# Patient Record
Sex: FEMALE | Race: WHITE | NOT HISPANIC OR LATINO | Employment: FULL TIME | URBAN - METROPOLITAN AREA
[De-identification: names, ages, dates, MRNs, and addresses within clinical notes are randomized per-mention and may not be internally consistent; named-entity substitution may affect disease eponyms.]

---

## 2020-11-20 ENCOUNTER — APPOINTMENT (RX ONLY)
Dept: URBAN - METROPOLITAN AREA CLINIC 32 | Facility: CLINIC | Age: 55
Setting detail: DERMATOLOGY
End: 2020-11-20

## 2020-11-20 DIAGNOSIS — Z41.9 ENCOUNTER FOR PROCEDURE FOR PURPOSES OTHER THAN REMEDYING HEALTH STATE, UNSPECIFIED: ICD-10-CM

## 2020-11-20 PROCEDURE — ? BOTOX

## 2020-11-20 NOTE — PROCEDURE: BOTOX
Show Lcl Units: No
Masseter Units: 0
Show Depressor Anguli Units: Yes
Detail Level: Detailed
Forehead Units: 40
Dilution (U/0.1 Cc): 4
Consent: Written consent obtained. Risks include but not limited to lid/brow ptosis, bruising, swelling, diplopia, temporary effect, incomplete chemical denervation.
Post-Care Instructions: Patient instructed to not lie down for 4 hours and limit physical activity for 24 hours.

## 2021-03-22 ENCOUNTER — APPOINTMENT (RX ONLY)
Dept: URBAN - METROPOLITAN AREA CLINIC 12 | Facility: CLINIC | Age: 56
Setting detail: DERMATOLOGY
End: 2021-03-22

## 2021-03-22 DIAGNOSIS — Z41.1 ENCOUNTER FOR COSMETIC SURGERY: ICD-10-CM

## 2021-03-22 PROCEDURE — ? CONSULTATION - AGING FACE

## 2021-03-22 PROCEDURE — ? PATIENT SPECIFIC COUNSELING

## 2021-03-22 NOTE — PROCEDURE: CONSULTATION - AGING FACE
Consultation Charge $ (Use Numbers Only, No Text Please.): 125
Send Procedure Quote As Charge: No
Detail Level: Detailed

## 2021-03-22 NOTE — PROCEDURE: PATIENT SPECIFIC COUNSELING
Detail Level: Simple
Other (Free Text): Patient presents today for consultation on Face and Jowls. Patient states she’s noticed her face, jowls and neck sagging over time and would like to look into options for a more refreshed appearance. Dr. Samson examined patient and reports patient would be a good candidate for lower face and NeckLift \\nLongest longevity and best option for tightening neck line, jowls and face, performed in the O.R. Under general anesthesia, recovery will be about 10-14days. Patient would also benefit for  the corner of the mouth. Patient to also consider refreshing her look with improving her deep tear troughs and sagging skin with her upper eyelids. Recommended a minimal invasive procedure in the office Liposuction with FaceTite, not considered the cisneros standard but she will get results from treatment. Patient was shown before and after photos and Ermelinda provided quote for LowerFace and NeckLift as well as liposuction with FaceTite. Patient verbalized understanding and advised to follow up as needed.

## 2021-04-02 ENCOUNTER — APPOINTMENT (RX ONLY)
Dept: URBAN - METROPOLITAN AREA CLINIC 12 | Facility: CLINIC | Age: 56
Setting detail: DERMATOLOGY
End: 2021-04-02

## 2021-04-02 DIAGNOSIS — Z41.1 ENCOUNTER FOR COSMETIC SURGERY: ICD-10-CM

## 2021-04-02 PROCEDURE — ? PRE-OP WORKLIST

## 2021-04-02 PROCEDURE — ? OTHER (COSMETIC)

## 2021-04-02 ASSESSMENT — LOCATION ZONE DERM: LOCATION ZONE: FACE

## 2021-04-02 ASSESSMENT — LOCATION DETAILED DESCRIPTION DERM: LOCATION DETAILED: LEFT INFERIOR CENTRAL MALAR CHEEK

## 2021-04-02 ASSESSMENT — LOCATION SIMPLE DESCRIPTION DERM: LOCATION SIMPLE: LEFT CHEEK

## 2021-04-02 NOTE — PROCEDURE: PRE-OP WORKLIST
Surgery Scheduled: Liposuction with face tite
Date Of Surgery: 4/14/2021
Detail Level: Simple
Photos Taken?: yes
Surgeon: Dr. Samson

## 2021-04-14 ENCOUNTER — APPOINTMENT (RX ONLY)
Dept: URBAN - METROPOLITAN AREA CLINIC 12 | Facility: CLINIC | Age: 56
Setting detail: DERMATOLOGY
End: 2021-04-14

## 2021-04-14 DIAGNOSIS — Z41.1 ENCOUNTER FOR COSMETIC SURGERY: ICD-10-CM

## 2021-04-14 PROCEDURE — ? INMODE RFAL

## 2021-04-14 NOTE — PROCEDURE: INMODE RFAL
Detail Level: Detailed
Tumescent Anesthesia Volume In Cc: 300
Dispo: Absorptive pads and garments applied. The patient was given post-op instructions and discharged home ambulatory and in good condition.
Facetite Internal Cutoff Temperature: 65
Infiltration: Targeted areas scrubbed with chlorhexadine gluconate, rinsed with sterile saline, intradermal blebs of tumescent local anesthetic, percutaneous infiltration with selected infiltration cannulas as per standard tumescent technique using highly dilute tumescent anesthetic solution of lidocaine, epinephrine, and bicarbonate, specific concentration used in each area is defined in surgeons orders.
Accutite Internal Cutoff Temperature: 50
Facetite External Cutoff Temperature: 40
Post-Care Instructions: Patient should avoid heavy lifting or exercise for several weeks.
Adits: 2 mm: Placed within and on the periphery of the targeted areas.
Surgeon: Dr. Samson
Price (Use Numbers Only, No Special Characters Or $): 7700
Accutite External Cutoff Temperature: 35
Liposuction: Microcannular liposuction is accomplished using the standard technique with [employer] microcannnulas ([place] and [place]ines[place] types). The patient tolerated the procedure well. Immediately postoperatively, the treated areas were covered with super-absorbent pads and held in place with elastic compression garments.
Total Kj Delivered: 0.8
Consent: The risks and benefits of liposuction were discussed with the patient and include but are not limited to: infection, bruising, lumpiness, pain, anesthesia reaction, dysesthesia, scarring in treatment area or puncture point, vasovagal reactions, tachycardia, nausea, necrosis, ulceration, color change and asymmetry.
Handpiece: FaceTite
Was Liposuction Also Performed: Yes
Treatment Number: 1
Estimated Blood Loss (Cc): minimal
Monitoring: Continuous cardiac monitoring and automated blood-pressure measurements every 30 minutes

## 2021-04-20 ENCOUNTER — APPOINTMENT (RX ONLY)
Dept: URBAN - METROPOLITAN AREA CLINIC 12 | Facility: CLINIC | Age: 56
Setting detail: DERMATOLOGY
End: 2021-04-20

## 2021-04-20 DIAGNOSIS — Z48.89 ENCOUNTER FOR OTHER SPECIFIED SURGICAL AFTERCARE: ICD-10-CM

## 2021-04-20 PROCEDURE — ? COUNSELING - POST-OP CHECK, LIPOSUCTION

## 2021-04-20 PROCEDURE — ? PATIENT SPECIFIC COUNSELING

## 2021-04-20 PROCEDURE — ? POST-OP CHECK

## 2021-04-20 PROCEDURE — 99024 POSTOP FOLLOW-UP VISIT: CPT

## 2021-04-20 ASSESSMENT — LOCATION ZONE DERM
LOCATION ZONE: NECK
LOCATION ZONE: FACE
LOCATION ZONE: FACE
LOCATION ZONE: NECK

## 2021-04-20 ASSESSMENT — LOCATION DETAILED DESCRIPTION DERM
LOCATION DETAILED: RIGHT SUPERIOR ANTERIOR NECK
LOCATION DETAILED: RIGHT SUPERIOR LATERAL BUCCAL CHEEK
LOCATION DETAILED: LEFT SUPERIOR CENTRAL BUCCAL CHEEK
LOCATION DETAILED: RIGHT SUPERIOR MEDIAL BUCCAL CHEEK
LOCATION DETAILED: LEFT SUPERIOR CENTRAL BUCCAL CHEEK
LOCATION DETAILED: RIGHT INFERIOR ANTERIOR NECK

## 2021-04-20 ASSESSMENT — LOCATION SIMPLE DESCRIPTION DERM
LOCATION SIMPLE: RIGHT ANTERIOR NECK
LOCATION SIMPLE: RIGHT CHEEK
LOCATION SIMPLE: RIGHT ANTERIOR NECK
LOCATION SIMPLE: LEFT CHEEK
LOCATION SIMPLE: LEFT CHEEK
LOCATION SIMPLE: RIGHT CHEEK

## 2021-04-20 NOTE — PROCEDURE: POST-OP CHECK
Detail Level: Detailed
Add Postop Global No-Charge Code (04341)?: yes
Wound Evaluated By: Dr. Samson

## 2021-04-20 NOTE — PROCEDURE: PATIENT SPECIFIC COUNSELING
Other (Free Text): Patient presents s/p facetite performed 4/14/2021. Patient reports doing well .  explained everything looks normal, normal swelling and bruising, reassured that the swelling in her cheeks will continue to go down, patient may discontinue wearing chin strap. Sutures removed today. Advised to follow up in 1 month.
Detail Level: Zone

## 2021-05-25 ENCOUNTER — APPOINTMENT (RX ONLY)
Dept: URBAN - METROPOLITAN AREA CLINIC 12 | Facility: CLINIC | Age: 56
Setting detail: DERMATOLOGY
End: 2021-05-25

## 2021-05-25 DIAGNOSIS — Z48.89 ENCOUNTER FOR OTHER SPECIFIED SURGICAL AFTERCARE: ICD-10-CM

## 2021-05-25 PROCEDURE — ? PATIENT SPECIFIC COUNSELING

## 2021-05-25 PROCEDURE — ? COUNSELING - POST-OP CHECK, LIPOSUCTION

## 2021-05-25 PROCEDURE — 99024 POSTOP FOLLOW-UP VISIT: CPT

## 2021-05-25 PROCEDURE — ? POST-OP CHECK

## 2021-05-25 ASSESSMENT — LOCATION SIMPLE DESCRIPTION DERM
LOCATION SIMPLE: RIGHT CHEEK
LOCATION SIMPLE: RIGHT ANTERIOR NECK
LOCATION SIMPLE: LEFT CHEEK
LOCATION SIMPLE: RIGHT ANTERIOR NECK
LOCATION SIMPLE: RIGHT CHEEK
LOCATION SIMPLE: LEFT CHEEK

## 2021-05-25 ASSESSMENT — LOCATION DETAILED DESCRIPTION DERM
LOCATION DETAILED: RIGHT INFERIOR ANTERIOR NECK
LOCATION DETAILED: RIGHT SUPERIOR MEDIAL BUCCAL CHEEK
LOCATION DETAILED: RIGHT SUPERIOR ANTERIOR NECK
LOCATION DETAILED: LEFT SUPERIOR CENTRAL BUCCAL CHEEK
LOCATION DETAILED: RIGHT SUPERIOR LATERAL BUCCAL CHEEK
LOCATION DETAILED: LEFT SUPERIOR CENTRAL BUCCAL CHEEK

## 2021-05-25 ASSESSMENT — LOCATION ZONE DERM
LOCATION ZONE: NECK
LOCATION ZONE: NECK
LOCATION ZONE: FACE
LOCATION ZONE: FACE

## 2021-05-25 NOTE — PROCEDURE: POST-OP CHECK
Detail Level: Detailed
Add Postop Global No-Charge Code (99495)?: yes
Wound Evaluated By: Dr. Samson

## 2021-05-25 NOTE — PROCEDURE: PATIENT SPECIFIC COUNSELING
Other (Free Text): Patient presents s/p facetite performed 4/14/2021. Patient reports doing well, voiced she still has swelling and numbness from procedure.  explained patient and voiced everything looks good and is healing well. Dr. Samson voiced that all swelling will reduce overtime. Patient advised to massage firm areas. Patient verbalized agreement and understanding. Patient advised to follow up in two months.
Detail Level: Zone

## 2022-05-19 ENCOUNTER — RX ONLY (OUTPATIENT)
Age: 57
Setting detail: RX ONLY
End: 2022-05-19

## 2022-05-19 RX ORDER — CLOBETASOL PROPIONATE 0.5 MG/G
CREAM TOPICAL
Qty: 30 | Refills: 3 | Status: ERX | COMMUNITY
Start: 2022-05-19

## 2022-05-19 RX ORDER — AZITHROMYCIN DIHYDRATE 250 MG/1
TABLET, FILM COATED ORAL
Qty: 6 | Refills: 0 | Status: ERX | COMMUNITY
Start: 2022-05-19

## 2023-06-07 ENCOUNTER — RX ONLY (OUTPATIENT)
Age: 58
Setting detail: RX ONLY
End: 2023-06-07

## 2023-06-07 RX ORDER — ERYTHROMYCIN 250 MG/1
TABLET, FILM COATED ORAL
Qty: 28 | Refills: 0 | Status: ERX | COMMUNITY
Start: 2023-06-07

## 2023-06-07 RX ORDER — AZITHROMYCIN DIHYDRATE 250 MG/1
TABLET, FILM COATED ORAL
Qty: 6 | Refills: 2 | Status: ERX

## 2024-01-10 ENCOUNTER — RX ONLY (OUTPATIENT)
Age: 59
Setting detail: RX ONLY
End: 2024-01-10

## 2024-01-10 RX ORDER — AZITHROMYCIN DIHYDRATE 250 MG/1
TABLET, FILM COATED ORAL
Qty: 6 | Refills: 2 | Status: ERX

## 2024-03-27 ENCOUNTER — RX ONLY (OUTPATIENT)
Age: 59
Setting detail: RX ONLY
End: 2024-03-27

## 2024-03-27 RX ORDER — SCOPOLAMINE 1 MG/3D
PATCH TRANSDERMAL
Qty: 4 | Refills: 1 | Status: ERX | COMMUNITY
Start: 2024-03-27

## 2024-05-31 ENCOUNTER — RX ONLY (OUTPATIENT)
Age: 59
Setting detail: RX ONLY
End: 2024-05-31

## 2024-05-31 RX ORDER — DOXYCYCLINE HYCLATE 100 MG/1
TABLET, COATED ORAL
Qty: 20 | Refills: 0 | Status: ERX | COMMUNITY
Start: 2024-05-31